# Patient Record
Sex: MALE | Race: OTHER | NOT HISPANIC OR LATINO | URBAN - METROPOLITAN AREA
[De-identification: names, ages, dates, MRNs, and addresses within clinical notes are randomized per-mention and may not be internally consistent; named-entity substitution may affect disease eponyms.]

---

## 2021-06-07 ENCOUNTER — EMERGENCY (EMERGENCY)
Facility: HOSPITAL | Age: 41
LOS: 1 days | Discharge: HOME | End: 2021-06-07
Attending: EMERGENCY MEDICINE | Admitting: EMERGENCY MEDICINE
Payer: COMMERCIAL

## 2021-06-07 VITALS
SYSTOLIC BLOOD PRESSURE: 163 MMHG | RESPIRATION RATE: 18 BRPM | OXYGEN SATURATION: 99 % | TEMPERATURE: 98 F | DIASTOLIC BLOOD PRESSURE: 82 MMHG | HEART RATE: 88 BPM

## 2021-06-07 DIAGNOSIS — R41.82 ALTERED MENTAL STATUS, UNSPECIFIED: ICD-10-CM

## 2021-06-07 DIAGNOSIS — Z79.899 OTHER LONG TERM (CURRENT) DRUG THERAPY: ICD-10-CM

## 2021-06-07 DIAGNOSIS — G43.909 MIGRAINE, UNSPECIFIED, NOT INTRACTABLE, WITHOUT STATUS MIGRAINOSUS: ICD-10-CM

## 2021-06-07 DIAGNOSIS — I10 ESSENTIAL (PRIMARY) HYPERTENSION: ICD-10-CM

## 2021-06-07 DIAGNOSIS — F43.0 ACUTE STRESS REACTION: ICD-10-CM

## 2021-06-07 DIAGNOSIS — Z20.822 CONTACT WITH AND (SUSPECTED) EXPOSURE TO COVID-19: ICD-10-CM

## 2021-06-07 DIAGNOSIS — Z79.83 LONG TERM (CURRENT) USE OF BISPHOSPHONATES: ICD-10-CM

## 2021-06-07 DIAGNOSIS — Z79.818 LONG TERM (CURRENT) USE OF OTHER AGENTS AFFECTING ESTROGEN RECEPTORS AND ESTROGEN LEVELS: ICD-10-CM

## 2021-06-07 PROCEDURE — 99285 EMERGENCY DEPT VISIT HI MDM: CPT

## 2021-06-07 PROCEDURE — 93010 ELECTROCARDIOGRAM REPORT: CPT

## 2021-06-07 PROCEDURE — 99053 MED SERV 10PM-8AM 24 HR FAC: CPT

## 2021-06-07 NOTE — ED ADULT TRIAGE NOTE - CHIEF COMPLAINT QUOTE
victorina with complaints of AMS. Patient arrived from [place] on Saturday and since then 'has not been acting like himself' as per wife. Patient is aox4 but as per wife content of what patient speaks about is bizarre. Patient has hx htn, pre-dm, high cholesterol and migraines.

## 2021-06-08 VITALS
SYSTOLIC BLOOD PRESSURE: 132 MMHG | HEART RATE: 92 BPM | TEMPERATURE: 98 F | OXYGEN SATURATION: 98 % | RESPIRATION RATE: 18 BRPM | DIASTOLIC BLOOD PRESSURE: 66 MMHG

## 2021-06-08 DIAGNOSIS — F43.0 ACUTE STRESS REACTION: ICD-10-CM

## 2021-06-08 LAB
ALBUMIN SERPL ELPH-MCNC: 4.3 G/DL — SIGNIFICANT CHANGE UP (ref 3.4–5)
ALP SERPL-CCNC: 43 U/L — SIGNIFICANT CHANGE UP (ref 40–120)
ALT FLD-CCNC: 28 U/L — SIGNIFICANT CHANGE UP (ref 12–42)
ANION GAP SERPL CALC-SCNC: 13 MMOL/L — SIGNIFICANT CHANGE UP (ref 9–16)
APAP SERPL-MCNC: <2 UG/ML — LOW (ref 10–30)
APPEARANCE UR: CLEAR — SIGNIFICANT CHANGE UP
AST SERPL-CCNC: 14 U/L — LOW (ref 15–37)
BASOPHILS # BLD AUTO: 0.05 K/UL — SIGNIFICANT CHANGE UP (ref 0–0.2)
BASOPHILS NFR BLD AUTO: 0.5 % — SIGNIFICANT CHANGE UP (ref 0–2)
BILIRUB SERPL-MCNC: 0.4 MG/DL — SIGNIFICANT CHANGE UP (ref 0.2–1.2)
BILIRUB UR-MCNC: NEGATIVE — SIGNIFICANT CHANGE UP
BUN SERPL-MCNC: 18 MG/DL — SIGNIFICANT CHANGE UP (ref 7–23)
CALCIUM SERPL-MCNC: 9.3 MG/DL — SIGNIFICANT CHANGE UP (ref 8.5–10.5)
CHLORIDE SERPL-SCNC: 104 MMOL/L — SIGNIFICANT CHANGE UP (ref 96–108)
CO2 SERPL-SCNC: 27 MMOL/L — SIGNIFICANT CHANGE UP (ref 22–31)
COLOR SPEC: YELLOW — SIGNIFICANT CHANGE UP
CREAT SERPL-MCNC: 1.31 MG/DL — HIGH (ref 0.5–1.3)
DIFF PNL FLD: NEGATIVE — SIGNIFICANT CHANGE UP
EOSINOPHIL # BLD AUTO: 0 K/UL — SIGNIFICANT CHANGE UP (ref 0–0.5)
EOSINOPHIL NFR BLD AUTO: 0 % — SIGNIFICANT CHANGE UP (ref 0–6)
ETHANOL SERPL-MCNC: <3 MG/DL — SIGNIFICANT CHANGE UP
GLUCOSE SERPL-MCNC: 111 MG/DL — HIGH (ref 70–99)
GLUCOSE UR QL: NEGATIVE — SIGNIFICANT CHANGE UP
HCT VFR BLD CALC: 46.9 % — SIGNIFICANT CHANGE UP (ref 39–50)
HGB BLD-MCNC: 15.4 G/DL — SIGNIFICANT CHANGE UP (ref 13–17)
IMM GRANULOCYTES NFR BLD AUTO: 0.5 % — SIGNIFICANT CHANGE UP (ref 0–1.5)
KETONES UR-MCNC: NEGATIVE — SIGNIFICANT CHANGE UP
LEUKOCYTE ESTERASE UR-ACNC: NEGATIVE — SIGNIFICANT CHANGE UP
LYMPHOCYTES # BLD AUTO: 1.69 K/UL — SIGNIFICANT CHANGE UP (ref 1–3.3)
LYMPHOCYTES # BLD AUTO: 16.9 % — SIGNIFICANT CHANGE UP (ref 13–44)
MCHC RBC-ENTMCNC: 28.1 PG — SIGNIFICANT CHANGE UP (ref 27–34)
MCHC RBC-ENTMCNC: 32.8 GM/DL — SIGNIFICANT CHANGE UP (ref 32–36)
MCV RBC AUTO: 85.4 FL — SIGNIFICANT CHANGE UP (ref 80–100)
MONOCYTES # BLD AUTO: 1.07 K/UL — HIGH (ref 0–0.9)
MONOCYTES NFR BLD AUTO: 10.7 % — SIGNIFICANT CHANGE UP (ref 2–14)
NEUTROPHILS # BLD AUTO: 7.12 K/UL — SIGNIFICANT CHANGE UP (ref 1.8–7.4)
NEUTROPHILS NFR BLD AUTO: 71.4 % — SIGNIFICANT CHANGE UP (ref 43–77)
NITRITE UR-MCNC: NEGATIVE — SIGNIFICANT CHANGE UP
NRBC # BLD: 0 /100 WBCS — SIGNIFICANT CHANGE UP (ref 0–0)
PCP SPEC-MCNC: SIGNIFICANT CHANGE UP
PH UR: 6 — SIGNIFICANT CHANGE UP (ref 5–8)
PLATELET # BLD AUTO: 260 K/UL — SIGNIFICANT CHANGE UP (ref 150–400)
POTASSIUM SERPL-MCNC: 3.5 MMOL/L — SIGNIFICANT CHANGE UP (ref 3.5–5.3)
POTASSIUM SERPL-SCNC: 3.5 MMOL/L — SIGNIFICANT CHANGE UP (ref 3.5–5.3)
PROT SERPL-MCNC: 8.3 G/DL — HIGH (ref 6.4–8.2)
PROT UR-MCNC: NEGATIVE MG/DL — SIGNIFICANT CHANGE UP
RBC # BLD: 5.49 M/UL — SIGNIFICANT CHANGE UP (ref 4.2–5.8)
RBC # FLD: 13.3 % — SIGNIFICANT CHANGE UP (ref 10.3–14.5)
SALICYLATES SERPL-MCNC: 0.7 MG/DL — LOW (ref 2.8–20)
SARS-COV-2 RNA SPEC QL NAA+PROBE: SIGNIFICANT CHANGE UP
SODIUM SERPL-SCNC: 144 MMOL/L — SIGNIFICANT CHANGE UP (ref 132–145)
SP GR SPEC: 1.02 — SIGNIFICANT CHANGE UP (ref 1–1.03)
TSH SERPL-MCNC: 1.02 UIU/ML — SIGNIFICANT CHANGE UP (ref 0.36–3.74)
UROBILINOGEN FLD QL: 0.2 E.U./DL — SIGNIFICANT CHANGE UP
WBC # BLD: 9.98 K/UL — SIGNIFICANT CHANGE UP (ref 3.8–10.5)
WBC # FLD AUTO: 9.98 K/UL — SIGNIFICANT CHANGE UP (ref 3.8–10.5)

## 2021-06-08 PROCEDURE — 70450 CT HEAD/BRAIN W/O DYE: CPT | Mod: 26

## 2021-06-08 PROCEDURE — 90792 PSYCH DIAG EVAL W/MED SRVCS: CPT | Mod: 95

## 2021-06-08 NOTE — ED BEHAVIORAL HEALTH NOTE - BEHAVIORAL HEALTH NOTE
===================  PRE-HOSPITAL COURSE  ===================  SOURCE:  VIRGINIA Asher/ED documentation   DETAILS:  Pt. BIB EMS, initiated by wife for AMS/bizarre s/p headache    ============  ED COURSE   ============  SOURCE:  VIRGINIA Asher/ED documentation   ARRIVAL:  EMS, wife   BELONGINGS:  no belongings of note   BEHAVIOR: Pt. arrived to ED alert and oriented, appearing in good hygiene.  Pt. cooperative, complied with triage processes w/o issue.  Wife explains that patient "has not been acting like himself" x2 days.  In ED pt. maintains eye contact, speech is clear, thoughts somewhat disorganized/ religiously preoccupied.  Pt. denies SI/HI and AH/VH; only expressing that he feels nervous/anxious in mood and is noted to be tapping the stretcher while being interviewed by ED staff.  Pt. seemed to notice he was doing so, and stopped to apologize.  Otherwise pt. has primarily been sleeping/resting comfortably while awaiting evaluation.   TREATMENT:  no medications given, no security intervention required   VISITORS:  wife present at bedside throughout     ========================  COLLATERAL  ========================  NAME: Martha Elizondo  NUMBER: at bedside   RELATIONSHIP: wife  RELIABILITY: reliable   COMMENTS: wife expresses that at this time she is not concerned as pt's CT scan and labwork came back normal, she reports pt. also has seemed to improve over the course of ED visit and she would feel most comfortable with pt. being discharged and returning to their hotel    ========================  PATIENT DEMOGRAPHICS:   ========================  HPI  BASELINE FUNCTIONING: At baseline pt. resides with wife and two children ages 18 y/o and 15 y/o. Currently vacationing here, staying in hotel.   DATE HPI STARTED: Saturday 6/5/21  DECOMPENSATION: Pt. arrived here in [facility] on Saturday and had a headache, which is frequent for pt. Wife describes that pt. does typically seem somewhat "off" for ~24 hours after these headaches, but this time pt. has been seemingly more "off" for a longer period of time as it is now Tuesday morning (Monday evening arrival).   She explains pt. has been more excitable and talking about god.  She does share he is a spiritual person, but just typically doesn't talk about it.  However, yesterday he was calling family talking about Amish in ways he usually would not.  She does not provide any specific examples of these bizarre statements   SUICIDALITY: wife denies  VIOLENCE:  wife denies  SUBSTANCE:  wife denies       ========================  PAST PSYCHIATRIC HISTORY  ========================  Pt. has no prior psychiatric hx.     ==============  OTHER HISTORY  ==============  CURRENT MEDICATION:  Sumatriptan, lisinopril, amlodipine (unsure of doses at this time)  MEDICAL HISTORY: migraines, HTN  ALLERGIES: NKA  LEGAL ISSUES: denies   FIREARM ACCESS: denies  SOCIAL HISTORY: denies trauma hx  FAMILY HISTORY: denies   DEVELOPMENTAL HISTORY: unremarkable         COVID Exposure Screen—collateral  1.	*Has the patient had a COVID-19 test in the last 90 days?  (  ) Yes   ( X ) No   (  ) Unknown- Reason: _____  IF YES PROCEED TO QUESTION #2. IF NO OR UNKNOWN, PLEASE SKIP TO QUESTION #3.  2.	Date of test(s) and result(s):   3.	*Has the patient tested positive for COVID-19 antibodies? (  ) Yes   ( X ) No   (  ) Unknown- Reason: _____  IF YES PROCEED TO QUESTION #4. IF NO or UNKNOWN, PLEASE SKIP TO QUESTION #5.  4.	Date of positive antibody test:  5.	*Has the patient received 2 doses of the COVID-19 vaccine? (  ) Yes   ( X ) No   (  ) Unknown- Reason: _____  IF YES PROCEED TO QUESTION #6. IF NO or UNKNOWN, PLEASE SKIP TO QUESTION #7.  6.	 Date of dose:   7.	*In the past 10 days, has the patient been around anyone with a positive COVID-19 test?* (  ) Yes   ( X ) No   (  ) Unknown- Reason: __  IF YES PROCEED TO QUESTION #8. IF NO or UNKNOWN, PLEASE SKIP TO QUESTION #13.  8.	Was the patient within 6 feet of them for at least 15 minutes? (  ) Yes   (  ) No   (  ) Unknown- Reason: _____  9.	Did the patient provide care for them? (  ) Yes   (  ) No   (  ) Unknown- Reason: ______  10.	Did the patient have direct physical contact with them (touched, hugged, or kissed them)? (  ) Yes   (  ) No    (  ) Unknown- Reason: __  11.	Did the patient share eating or drinking utensils with them? (  ) Yes   (  ) No    (  ) Unknown- Reason: ____  12.	Did they sneeze, cough, or somehow get respiratory droplets on the patient? (  ) Yes   (  ) No    (  ) Unknown- Reason: ______  13.	*Has the patient been out of [place] within the past 10 days?* (  X) Yes   (  ) No   (  ) Unknown- Reason: _____  IF YES PLEASE ANSWER THE FOLLOWING QUESTIONS:  14.	Which state/country did they go to? [place]  15.	Were they there over 24 hours? (X  ) Yes   (  ) No    (  ) Unknown- Reason: ______  16.	Date of return to [facility]: Pt. resides in [place] and is visiting [facility], arrived here Saturday

## 2021-06-08 NOTE — ED BEHAVIORAL HEALTH ASSESSMENT NOTE - DESCRIPTION
HTN, migraines see bh note    Vital Signs Last 24 Hrs  T(C): 36.7 (08 Jun 2021 02:12), Max: 36.7 (07 Jun 2021 23:39)  T(F): 98 (08 Jun 2021 02:12), Max: 98 (07 Jun 2021 23:39)  HR: 73 (08 Jun 2021 02:12) (73 - 88)  BP: 142/75 (08 Jun 2021 02:12) (142/75 - 163/82)  BP(mean): --  RR: 18 (08 Jun 2021 02:12) (18 - 18)  SpO2: 97% (08 Jun 2021 02:12) (97% - 99%)  17 years, lives in [facility] with family, has 2 children (17, 15);

## 2021-06-08 NOTE — ED BEHAVIORAL HEALTH ASSESSMENT NOTE - RISK ASSESSMENT
Low Acute Suicide Risk rf - acute medical issue  pf - supportive family, future oriented, denies si/hi, no past admissions, no past SAs or self harm, no hx of violence, no substance, help seeking, responsibility to family, healthy    COVID Exposure Screen- Patient  1.	*Have you had a COVID-19 test in the last 90 days?  (  ) Yes   (x  ) No   (  ) Unknown- Reason: _____  IF YES PROCEED TO QUESTION #2. IF NO OR UNKNOWN, PLEASE SKIP TO QUESTION #3.  2.	Date of test(s) and result(s): ________  3.	*Have you tested positive for COVID-19 antibodies? (  ) Yes   (x  ) No   (  ) Unknown- Reason: _____  IF YES PROCEED TO QUESTION #4. IF NO or UNKNOWN, PLEASE SKIP TO QUESTION #5.  4.	Date of positive antibody test: ________  5.	*Have you received 2 doses of the COVID-19 vaccine? (  ) Yes   ( x ) No   (  ) Unknown- Reason: _____   IF YES PROCEED TO QUESTION #6. IF NO or UNKNOWN, PLEASE SKIP TO QUESTION #7.  6.	Date of second dose: ________  7.	*In the past 10 days, have you been around anyone with a positive COVID-19 test?* (  ) Yes   (x  ) No   (  ) Unknown- Reason: ____  IF YES PROCEED TO QUESTION #8. IF NO or UNKNOWN, PLEASE SKIP TO QUESTION #13.  8.	Were you within 6 feet of them for at least 15 minutes? (  ) Yes   (  ) No   (  ) Unknown- Reason: _____  9.	Have you provided care for them? (  ) Yes   (  ) No   (  ) Unknown- Reason: ______  10.	Have you had direct physical contact with them (touched, hugged, or kissed them)? (  ) Yes   (  ) No    (  ) Unknown- Reason: _____  11.	Have you shared eating or drinking utensils with them? (  ) Yes   (  ) No    (  ) Unknown- Reason: ____  12.	Have they sneezed, coughed, or somehow gotten respiratory droplets on you? (  ) Yes   (  ) No    (  ) Unknown- Reason: ______  13.	*Have you been out of [place] within the past 10 days?* (  ) Yes   (x  ) No   (  ) Unknown- Reason: _____  IF YES PLEASE ANSWER THE FOLLOWING QUESTIONS:  14.	Which state/country have you been to? ______  15.	Were you there over 24 hours? (  ) Yes   (  ) No    (  ) Unknown- Reason: ______  16.	Date of return to [facility]: ______

## 2021-06-08 NOTE — ED BEHAVIORAL HEALTH ASSESSMENT NOTE - DETAILS
n/a 2 children (17, 15) patient advised to return to ED or call 911 if symptoms worsen or thoughts to harm self or others occur. discussed with wife

## 2021-06-08 NOTE — ED PROVIDER NOTE - CARE PLAN
Principal Discharge DX:	Altered mental status, unspecified altered mental status type  Secondary Diagnosis:	Disorganized behavior   Principal Discharge DX:	Altered mental status, unspecified altered mental status type

## 2021-06-08 NOTE — ED PROVIDER NOTE - OBJECTIVE STATEMENT
39 y/o M w/hx migraines on sumatriptan, HTN on lisinopril and amlodipine, visiting from [place], has been exhibiting bizarre behavior and racing/disorganized thoughts with hyper-religiosity for 2 days. Had a mild HA yesterday improved with sumatriptan. No active pain. States he feels nervous and is tapping the side of his stretcher quickly and loudly during interview, then catches himself and apologizes. Denies drug use. Per wife at bedside, pt is not known to do any kind of drugs. No hx psychosis or behavioral disturbance in the past. No fever/chills/cough or recent illness. No neck pain/stiffness.

## 2021-06-08 NOTE — ED ADULT NURSE NOTE - NSIMPLEMENTINTERV_GEN_ALL_ED
Implemented All Universal Safety Interventions:  [place] to call system. Call bell, personal items and telephone within reach. Instruct patient to call for assistance. Room bathroom lighting operational. Non-slip footwear when patient is off stretcher. Physically safe environment: no spills, clutter or unnecessary equipment. Stretcher in lowest position, wheels locked, appropriate side rails in place.

## 2021-06-08 NOTE — ED PROVIDER NOTE - PATIENT PORTAL LINK FT
You can access the FollowMyHealth Patient Portal offered by [facility] by registering at the following website: [email or web address]. By joining [facility]’s FollowMyHealth portal, you will also be able to view your health information using other applications (apps) compatible with our system.

## 2021-06-08 NOTE — ED BEHAVIORAL HEALTH ASSESSMENT NOTE - SUMMARY
Patient is a 41yo male, domiciled with wife and 2 children, lives in [place], [place], currently visiting [place] on vacation, with no formal pph, no prior psych admissions, no current outpatient tx, no past reported SAs or self harm, no known hx of violence, denies substance use, and PMH of migraines, HTN. He is BIBEMS with wife for bizarre behavior, disorganization, and hyperreligiosity for 2 days.     Patient is visiting [facility] from [place] with family and suffered a migraine. Per wife, patient sometimes has residual postmigraine symptoms of confusion, talkativeness, and hyperreligiosity, but patient took longer to return to normal this time. He appeared, calm, cooperative, euthymic, linear, with normal speech and affect on exam. Patient did not exhibit any s/sx of acute dysphoria, mumtaz, paranoia, or psychosis and denied any mood or psychiatric sx. Both patient and wife feel safe returning home - patient does not require inpatient hospitalization at this time.

## 2021-06-08 NOTE — ED PROVIDER NOTE - PHYSICAL EXAMINATION
VITAL SIGNS: I have reviewed nursing notes and confirm.  CONSTITUTIONAL: Well-developed; well-nourished; in no acute distress.  SKIN: Skin exam is warm and dry, no acute rash.  HEAD: Normocephalic; atraumatic.  EYES: PERRL, EOM intact; conjunctiva and sclera clear.  ENT: No nasal discharge; airway clear.  NECK: Supple; non tender.  CARD: S1, S2 normal; no murmurs, gallops, or rubs. Regular rate and rhythm.  RESP: Unlabored. No wheezes, rales or rhonchi.  ABD: soft; non-distended; non-tender  EXT: Normal ROM. No cyanosis or edema. Non-ttp all ext, distal pulses intact  NEURO: Alert, oriented. Grossly unremarkable.  PSYCH: Cooperative, + disorganized, + hyper-Buddhist, mostly directable

## 2021-06-08 NOTE — ED BEHAVIORAL HEALTH ASSESSMENT NOTE - NSSUICPROTFACT_PSY_ALL_CORE
Responsibility to children, family, or others/Identifies reasons for living/Supportive social network of family or friends/Cultural, spiritual and/or moral attitudes against suicide/Yazidi beliefs

## 2021-06-08 NOTE — ED BEHAVIORAL HEALTH ASSESSMENT NOTE - HPI (INCLUDE ILLNESS QUALITY, SEVERITY, DURATION, TIMING, CONTEXT, MODIFYING FACTORS, ASSOCIATED SIGNS AND SYMPTOMS)
Patient is a 41yo male, domiciled with wife and 2 children, lives in [place], [place], currently visiting [place] on vacation, with no formal pph, no prior psych admissions, no current outpatient tx, no past reported SAs or self harm, no known hx of violence, denies substance use, and PMH of migraines, HTN. He is BIBEMS with wife for bizarre behavior, disorganization, and hyperreligiosity for 2 days.     Patient Patient is a 39yo male, domiciled with wife and 2 children, lives in [place], [place], currently visiting [place] on vacation, with no formal pph, no prior psych admissions, no current outpatient tx, no past reported SAs or self harm, no known hx of violence, denies substance use, and PMH of migraines, HTN. He is BIBEMS with wife for bizarre behavior, disorganization, and hyperreligiosity for 2 days.     On exam, patient appears calm, cooperative, euthymic, linear, with normal speech and affect, although somewhat drowsy. He reports he lives in [place] and is vacationing with his family (wife, two teens) in [place] since Saturday with plan to return Thurs. He reports his wife noted he was talking differently, talking more "good and positive" and about God. Patient had a headache yesterday or today and took advil or his sumatriptan. He reports his mood was better today and he slept more than usual yesterday, although prior to today his sleep was normal. He denies any mood sx including depression, anhedonia, hopelessness, anxiety, guilt, fatigue, difficulty focusing. He denies any past or active SIIP, HIIP, aggressive IIP, SAs or self harm. Patient denies any s/sx of mumtaz or psychosis including increased goal directed activities, decreased need for sleep, talkativeness, racing thoughts, increased distractibility, improved self esteem with grandiose thought, hyper-sexual behavior, hyperreligiosity, impulsivity and risky behavior ; AVH, paranoia, delusions, thought insertion, ideas of reference. He has not had any recent changes in behavior or routine other than vacation and headaches. Patient denies any substance use or access to guns/weapons. He feels safe for discharge.     See  note for collateral info from wife at bedside obtained by [facility] - Patient is a 41yo male, domiciled with wife and 2 children, lives in [place], [place], currently visiting [place] on vacation, with no formal pph, no prior psych admissions, no current outpatient tx, no past reported SAs or self harm, no known hx of violence, denies substance use, and PMH of migraines, HTN. He is BIBEMS with wife for bizarre behavior, disorganization, and hyperreligiosity for 2 days.     On exam, patient appears calm, cooperative, euthymic, linear, with normal speech and affect, although somewhat drowsy. He reports he lives in [place] and is vacationing with his family (wife, two teens) in [place] since Saturday with plan to return Thurs. He reports his wife noted he was talking differently, talking more "good and positive" and about God. Patient had a headache yesterday or today and took advil or his sumatriptan. He reports his mood was better today and he slept more than usual yesterday, although prior to today his sleep was normal. He denies any mood sx including depression, anhedonia, hopelessness, anxiety, guilt, fatigue, difficulty focusing. He denies any past or active SIIP, HIIP, aggressive IIP, SAs or self harm. Patient denies any s/sx of mumtaz or psychosis including increased goal directed activities, decreased need for sleep, talkativeness, racing thoughts, increased distractibility, improved self esteem with grandiose thought, hyper-sexual behavior, hyperreligiosity, impulsivity and risky behavior ; AVH, paranoia, delusions, thought insertion, ideas of reference. He has not had any recent changes in behavior or routine other than vacation and headaches. Patient denies any substance use or access to guns/weapons. He feels safe for discharge.     See  note for collateral info from wife at bedside obtained by [facility] - wife reports patient's post migraine episode was longer than usual and she otherwise has no safety concerns. She feels safe with discharge and returning home with patient.

## 2021-06-08 NOTE — ED BEHAVIORAL HEALTH ASSESSMENT NOTE - SAFETY PLAN ADDT'L DETAILS
Education provided regarding environmental safety / lethal means restriction/Provision of National Suicide Prevention Lifeline 7-455-787-TALK (8757)

## 2021-06-08 NOTE — ED ADULT NURSE REASSESSMENT NOTE - NS ED NURSE REASSESS COMMENT FT1
Received Pt from previous RN lying on stretcher awake and alert, breathing with ease on RA and NAD. IV site is patent. Family at the bedside. Awaiting telepsych consult.

## 2023-09-05 NOTE — ED ADULT NURSE NOTE - BREATH SOUNDS, MLM
Detail Level: Simple Additional Notes: . \\nSpot on the left occipital scalp is new. Render Risk Assessment In Note?: no Clear